# Patient Record
Sex: FEMALE | Race: WHITE | URBAN - METROPOLITAN AREA
[De-identification: names, ages, dates, MRNs, and addresses within clinical notes are randomized per-mention and may not be internally consistent; named-entity substitution may affect disease eponyms.]

---

## 2023-01-03 ENCOUNTER — OFFICE VISIT (OUTPATIENT)
Dept: URBAN - METROPOLITAN AREA CLINIC 82 | Facility: CLINIC | Age: 33
End: 2023-01-03
Payer: COMMERCIAL

## 2023-01-03 DIAGNOSIS — H52.03 HYPERMETROPIA, BILATERAL: Primary | ICD-10-CM

## 2023-01-03 PROCEDURE — 92004 COMPRE OPH EXAM NEW PT 1/>: CPT | Performed by: OPTOMETRIST

## 2023-01-03 ASSESSMENT — KERATOMETRY
OD: 43.75
OS: 43.75

## 2023-01-03 ASSESSMENT — INTRAOCULAR PRESSURE
OD: 17
OS: 15

## 2023-01-03 ASSESSMENT — VISUAL ACUITY
OD: 20/20
OS: 20/20

## 2023-01-03 NOTE — IMPRESSION/PLAN
Impression: Hypermetropia, bilateral: H52.03. Plan: Educated pt on exam findings. Updated and finalized SRx for computer/near. Educated pt on 1-2 week adaptation period with updated SRx. Pt expressed understanding. RTC 1 year for CE, or PRN.